# Patient Record
Sex: FEMALE | Race: WHITE | NOT HISPANIC OR LATINO | Employment: STUDENT | URBAN - METROPOLITAN AREA
[De-identification: names, ages, dates, MRNs, and addresses within clinical notes are randomized per-mention and may not be internally consistent; named-entity substitution may affect disease eponyms.]

---
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
## 2024-01-08 ENCOUNTER — OFFICE VISIT (OUTPATIENT)
Dept: URGENT CARE | Facility: CLINIC | Age: 24
End: 2024-01-08
Payer: COMMERCIAL

## 2024-01-08 VITALS
BODY MASS INDEX: 22.77 KG/M2 | TEMPERATURE: 99 F | OXYGEN SATURATION: 97 % | WEIGHT: 145.06 LBS | HEART RATE: 86 BPM | HEIGHT: 67 IN | DIASTOLIC BLOOD PRESSURE: 68 MMHG | RESPIRATION RATE: 18 BRPM | SYSTOLIC BLOOD PRESSURE: 113 MMHG

## 2024-01-08 DIAGNOSIS — J10.1 INFLUENZA A: Primary | ICD-10-CM

## 2024-01-08 DIAGNOSIS — R05.9 COUGH, UNSPECIFIED TYPE: ICD-10-CM

## 2024-01-08 DIAGNOSIS — R06.2 WHEEZING: ICD-10-CM

## 2024-01-08 LAB
CTP QC/QA: YES
POC MOLECULAR INFLUENZA A AGN: POSITIVE
POC MOLECULAR INFLUENZA B AGN: NEGATIVE

## 2024-01-08 PROCEDURE — 96372 THER/PROPH/DIAG INJ SC/IM: CPT | Mod: S$GLB,,,

## 2024-01-08 PROCEDURE — 99213 OFFICE O/P EST LOW 20 MIN: CPT | Mod: 25,S$GLB,,

## 2024-01-08 PROCEDURE — 87502 INFLUENZA DNA AMP PROBE: CPT | Mod: QW,S$GLB,,

## 2024-01-08 RX ORDER — DEXAMETHASONE SODIUM PHOSPHATE 100 MG/10ML
10 INJECTION INTRAMUSCULAR; INTRAVENOUS
Status: COMPLETED | OUTPATIENT
Start: 2024-01-08 | End: 2024-01-08

## 2024-01-08 RX ORDER — GUAIFENESIN 600 MG/1
1200 TABLET, EXTENDED RELEASE ORAL 2 TIMES DAILY
Qty: 40 TABLET | Refills: 0 | Status: SHIPPED | OUTPATIENT
Start: 2024-01-08 | End: 2024-01-18

## 2024-01-08 RX ORDER — ALBUTEROL SULFATE 90 UG/1
2 AEROSOL, METERED RESPIRATORY (INHALATION) EVERY 6 HOURS PRN
Qty: 18 G | Refills: 0 | Status: SHIPPED | OUTPATIENT
Start: 2024-01-08 | End: 2025-01-07

## 2024-01-08 RX ORDER — PROMETHAZINE HYDROCHLORIDE AND DEXTROMETHORPHAN HYDROBROMIDE 6.25; 15 MG/5ML; MG/5ML
5 SYRUP ORAL EVERY 4 HOURS PRN
Qty: 180 ML | Refills: 0 | Status: SHIPPED | OUTPATIENT
Start: 2024-01-08 | End: 2024-01-18

## 2024-01-08 RX ORDER — DEXTROAMPHETAMINE SACCHARATE, AMPHETAMINE ASPARTATE MONOHYDRATE, DEXTROAMPHETAMINE SULFATE AND AMPHETAMINE SULFATE 5; 5; 5; 5 MG/1; MG/1; MG/1; MG/1
20 CAPSULE, EXTENDED RELEASE ORAL EVERY MORNING
COMMUNITY

## 2024-01-08 RX ORDER — METHYLPREDNISOLONE 4 MG/1
TABLET ORAL
Qty: 21 EACH | Refills: 0 | Status: SHIPPED | OUTPATIENT
Start: 2024-01-08 | End: 2024-01-29

## 2024-01-08 RX ADMIN — DEXAMETHASONE SODIUM PHOSPHATE 10 MG: 100 INJECTION INTRAMUSCULAR; INTRAVENOUS at 01:01

## 2024-01-08 NOTE — PATIENT INSTRUCTIONS
Please drink plenty of fluids.  Please get plenty of rest.  Please return here or go to the Emergency Department for any concerns or worsening of condition.  If you were given a steroid shot in the clinic and have also been given a prescription for a steroid such as Prednisone or a Medrol Dose Pack, please begin taking them tomorrow.  Continue daily antihistamine like Claritin and daily nasal spray as directed.   Take mucinex during the day and promethazine-DM at night for cough. Use albuterol inhaler at least once today and use it tomorrow as needed.   Recommended for patient to drink hot tea with honey. Consider eating softer foods such as soup and broth for the next couple of days to prevent further throat irritation. Recommended for patient to refrain from acidic foods (such as tomatoes or caffeine) to prevent throat irritation for the next couple of days.   If you do not have Hypertension or any history of palpitations, it is ok to take over the counter Sudafed or Mucinex D or Allegra-D or Claritin-D or Zyrtec-D.  If you do take one of the above, it is ok to combine that with plain over the counter Mucinex or Allegra or Claritin or Zyrtec.  If for example you are taking Zyrtec -D, you can combine that with Mucinex, but not Mucinex-D.  If you are taking Mucinex-D, you can combine that with plain Allegra or Claritin or Zyrtec.   If you do have Hypertension or palpitations, it is safe to take Coricidin HBP for relief of sinus symptoms.  If not allergic, please take over the counter Tylenol (Acetaminophen) and/or Motrin (Ibuprofen) as directed for control of pain and/or fever.  Please follow up with your primary care doctor or specialist as needed.    If you  smoke, please stop smoking.

## 2024-01-08 NOTE — PROGRESS NOTES
"Subjective:      Patient ID: Cathi Aiken is a 23 y.o. female.    Vitals:  height is 5' 7" (1.702 m) and weight is 65.8 kg (145 lb 1 oz). Her oral temperature is 98.5 °F (36.9 °C). Her blood pressure is 113/68 and her pulse is 86. Her respiration is 18 and oxygen saturation is 97%.     Chief Complaint: Cough    24 y/o female presents to clinic with chief complaint of productive cough which is worse in the morning, body aches, vomiting, diarrhea, fever, SOB, chest congestion, rhinorrhea. Symptoms started 5 week ago. Patient has taken Nyquil, tylenol sinus and flu with no relief. She states one of her friends tested positive for flu from a recent vacation. Patient does have a history of seasonal allergies, she takes Claritin as needed. Denies numbness or tingling. Denies radiation of pain. Denies chills, chest pain, abdominal pain, nausea, urinary symptoms or rashes.         Cough  This is a new problem. The current episode started 1 to 4 weeks ago. The problem has been unchanged. The problem occurs constantly. The cough is Productive of sputum. Associated symptoms include chest pain, chills, a fever, headaches, nasal congestion, postnasal drip, shortness of breath and wheezing. Pertinent negatives include no ear congestion, ear pain, eye redness, hemoptysis, myalgias, rash or sore throat. The symptoms are aggravated by lying down. She has tried OTC cough suppressant for the symptoms. The treatment provided no relief. Her past medical history is significant for environmental allergies. There is no history of asthma, bronchiectasis, bronchitis, COPD, emphysema or pneumonia.       Constitution: Positive for chills, fever and generalized weakness. Negative for appetite change, sweating, fatigue and unexpected weight change.   HENT:  Positive for congestion and postnasal drip. Negative for ear pain, ear discharge, tinnitus, sinus pain, sinus pressure, sore throat, trouble swallowing and voice change.    Neck: Negative " for neck pain, neck stiffness and neck swelling.   Cardiovascular:  Positive for chest pain. Negative for leg swelling, palpitations and sob on exertion.   Eyes:  Negative for eye discharge, eye itching, eye pain and eye redness.   Respiratory:  Positive for chest tightness, cough, shortness of breath and wheezing. Negative for sleep apnea, sputum production, bloody sputum and COPD.    Gastrointestinal:  Positive for vomiting and diarrhea. Negative for abdominal pain, nausea and constipation.   Genitourinary:  Negative for dysuria, frequency and urgency.   Musculoskeletal:  Negative for pain and muscle ache.   Skin:  Negative for rash.   Allergic/Immunologic: Positive for environmental allergies and seasonal allergies. Negative for food allergies, itching and sneezing.   Neurological:  Positive for headaches. Negative for dizziness and light-headedness.   Psychiatric/Behavioral:  Negative for confusion.       Objective:     Physical Exam   Constitutional: She is oriented to person, place, and time. She appears well-developed. She is cooperative.  Non-toxic appearance. She appears ill. No distress.   HENT:   Head: Normocephalic and atraumatic.   Ears:   Right Ear: Hearing, tympanic membrane, external ear and ear canal normal.   Left Ear: Hearing, tympanic membrane, external ear and ear canal normal.   Nose: Mucosal edema and rhinorrhea present. No nasal deformity. No epistaxis. Right sinus exhibits maxillary sinus tenderness. Right sinus exhibits no frontal sinus tenderness. Left sinus exhibits maxillary sinus tenderness. Left sinus exhibits no frontal sinus tenderness.   Mouth/Throat: Uvula is midline and mucous membranes are normal. No trismus in the jaw. Normal dentition. No uvula swelling. Posterior oropharyngeal edema, posterior oropharyngeal erythema and cobblestoning present. No oropharyngeal exudate. Tonsils are 1+ on the right. Tonsils are 2+ on the left. No tonsillar exudate.   Eyes: Conjunctivae and lids  are normal. No scleral icterus. Extraocular movement intact   Neck: Trachea normal and phonation normal. Neck supple. No edema present. No erythema present. No neck rigidity present.   Cardiovascular: Normal rate, regular rhythm, normal heart sounds and normal pulses.   Pulmonary/Chest: Effort normal. No accessory muscle usage or stridor. No tachypnea and no bradypnea. No respiratory distress. She has no decreased breath sounds. She has wheezes. She has rhonchi. She has no rales.   Abdominal: Normal appearance.   Musculoskeletal: Normal range of motion.         General: No deformity. Normal range of motion.   Lymphadenopathy:     She has cervical adenopathy.   Neurological: She is alert and oriented to person, place, and time. She exhibits normal muscle tone. Coordination normal.   Skin: Skin is warm, dry, intact, not diaphoretic and not pale.   Psychiatric: Her speech is normal and behavior is normal. Judgment and thought content normal.   Nursing note and vitals reviewed.      Assessment:     1. Influenza A    2. Cough, unspecified type    3. Wheezing        Plan:     Results for orders placed or performed in visit on 01/08/24   POCT Influenza A/B MOLECULAR   Result Value Ref Range    POC Molecular Influenza A Ag Positive (A) Negative, Not Reported    POC Molecular Influenza B Ag Negative Negative, Not Reported     Acceptable Yes        Influenza A  -     dexAMETHasone injection 10 mg  -     methylPREDNISolone (MEDROL DOSEPACK) 4 mg tablet; use as directed  Dispense: 21 each; Refill: 0  -     guaiFENesin (MUCINEX) 600 mg 12 hr tablet; Take 2 tablets (1,200 mg total) by mouth 2 (two) times daily. for 10 days  Dispense: 40 tablet; Refill: 0  -     promethazine-dextromethorphan (PROMETHAZINE-DM) 6.25-15 mg/5 mL Syrp; Take 5 mLs by mouth every 4 (four) hours as needed (at night).  Dispense: 180 mL; Refill: 0  -     albuterol (VENTOLIN HFA) 90 mcg/actuation inhaler; Inhale 2 puffs into the lungs every 6  (six) hours as needed for Wheezing. Rescue  Dispense: 18 g; Refill: 0    Cough, unspecified type  -     POCT Influenza A/B MOLECULAR  -     guaiFENesin (MUCINEX) 600 mg 12 hr tablet; Take 2 tablets (1,200 mg total) by mouth 2 (two) times daily. for 10 days  Dispense: 40 tablet; Refill: 0  -     promethazine-dextromethorphan (PROMETHAZINE-DM) 6.25-15 mg/5 mL Syrp; Take 5 mLs by mouth every 4 (four) hours as needed (at night).  Dispense: 180 mL; Refill: 0    Wheezing  -     dexAMETHasone injection 10 mg  -     methylPREDNISolone (MEDROL DOSEPACK) 4 mg tablet; use as directed  Dispense: 21 each; Refill: 0  -     albuterol (VENTOLIN HFA) 90 mcg/actuation inhaler; Inhale 2 puffs into the lungs every 6 (six) hours as needed for Wheezing. Rescue  Dispense: 18 g; Refill: 0      Recommended for patient to drink hot tea with honey. Consider eating softer foods such as soup and broth for the next couple of days to prevent further throat irritation. Recommended for patient to refrain from acidic foods (such as tomatoes or caffeine) to prevent throat irritation for the next couple of days. Patient can take OTC Acetaminophen (Tylenol) and/or Ibuprofen (Motrin) as needed for pain relief and/or fever relief. Continue to drink plenty of fluids. Follow up with PCP.      Patient Instructions   Please drink plenty of fluids.  Please get plenty of rest.  Please return here or go to the Emergency Department for any concerns or worsening of condition.  If you were given a steroid shot in the clinic and have also been given a prescription for a steroid such as Prednisone or a Medrol Dose Pack, please begin taking them tomorrow.  Continue daily antihistamine like Claritin and daily nasal spray as directed.   Take mucinex during the day and promethazine-DM at night for cough. Use albuterol inhaler at least once today and use it tomorrow as needed.   Recommended for patient to drink hot tea with honey. Consider eating softer foods such as  soup and broth for the next couple of days to prevent further throat irritation. Recommended for patient to refrain from acidic foods (such as tomatoes or caffeine) to prevent throat irritation for the next couple of days.   If you do not have Hypertension or any history of palpitations, it is ok to take over the counter Sudafed or Mucinex D or Allegra-D or Claritin-D or Zyrtec-D.  If you do take one of the above, it is ok to combine that with plain over the counter Mucinex or Allegra or Claritin or Zyrtec.  If for example you are taking Zyrtec -D, you can combine that with Mucinex, but not Mucinex-D.  If you are taking Mucinex-D, you can combine that with plain Allegra or Claritin or Zyrtec.   If you do have Hypertension or palpitations, it is safe to take Coricidin HBP for relief of sinus symptoms.  If not allergic, please take over the counter Tylenol (Acetaminophen) and/or Motrin (Ibuprofen) as directed for control of pain and/or fever.  Please follow up with your primary care doctor or specialist as needed.    If you  smoke, please stop smoking.           Additional MDM:     Heart Failure Score:   COPD = No

## 2024-05-28 ENCOUNTER — OFFICE VISIT (OUTPATIENT)
Dept: RHEUMATOLOGY | Facility: CLINIC | Age: 24
End: 2024-05-28
Payer: COMMERCIAL

## 2024-05-28 ENCOUNTER — LAB VISIT (OUTPATIENT)
Dept: LAB | Facility: HOSPITAL | Age: 24
End: 2024-05-28
Attending: INTERNAL MEDICINE
Payer: COMMERCIAL

## 2024-05-28 VITALS
HEART RATE: 81 BPM | BODY MASS INDEX: 20.48 KG/M2 | DIASTOLIC BLOOD PRESSURE: 85 MMHG | WEIGHT: 130.75 LBS | SYSTOLIC BLOOD PRESSURE: 126 MMHG

## 2024-05-28 DIAGNOSIS — M25.50 HYPERMOBILITY ARTHRALGIA: ICD-10-CM

## 2024-05-28 DIAGNOSIS — F51.01 PRIMARY INSOMNIA: ICD-10-CM

## 2024-05-28 DIAGNOSIS — M25.50 POLYARTHRALGIA: Primary | ICD-10-CM

## 2024-05-28 DIAGNOSIS — M25.50 POLYARTHRALGIA: ICD-10-CM

## 2024-05-28 LAB
CCP AB SER IA-ACNC: <0.5 U/ML
CRP SERPL-MCNC: 0.3 MG/L (ref 0–8.2)
ERYTHROCYTE [SEDIMENTATION RATE] IN BLOOD BY PHOTOMETRIC METHOD: 4 MM/HR (ref 0–36)
RHEUMATOID FACT SERPL-ACNC: <13 IU/ML (ref 0–15)

## 2024-05-28 PROCEDURE — 3008F BODY MASS INDEX DOCD: CPT | Mod: CPTII,S$GLB,, | Performed by: INTERNAL MEDICINE

## 2024-05-28 PROCEDURE — 3079F DIAST BP 80-89 MM HG: CPT | Mod: CPTII,S$GLB,, | Performed by: INTERNAL MEDICINE

## 2024-05-28 PROCEDURE — 1159F MED LIST DOCD IN RCRD: CPT | Mod: CPTII,S$GLB,, | Performed by: INTERNAL MEDICINE

## 2024-05-28 PROCEDURE — 99205 OFFICE O/P NEW HI 60 MIN: CPT | Mod: S$GLB,,, | Performed by: INTERNAL MEDICINE

## 2024-05-28 PROCEDURE — 81374 HLA I TYPING 1 ANTIGEN LR: CPT | Performed by: INTERNAL MEDICINE

## 2024-05-28 PROCEDURE — 36415 COLL VENOUS BLD VENIPUNCTURE: CPT | Performed by: INTERNAL MEDICINE

## 2024-05-28 PROCEDURE — 86431 RHEUMATOID FACTOR QUANT: CPT | Performed by: INTERNAL MEDICINE

## 2024-05-28 PROCEDURE — 85652 RBC SED RATE AUTOMATED: CPT | Performed by: INTERNAL MEDICINE

## 2024-05-28 PROCEDURE — 86140 C-REACTIVE PROTEIN: CPT | Performed by: INTERNAL MEDICINE

## 2024-05-28 PROCEDURE — 86200 CCP ANTIBODY: CPT | Performed by: INTERNAL MEDICINE

## 2024-05-28 PROCEDURE — 99999 PR PBB SHADOW E&M-EST. PATIENT-LVL IV: CPT | Mod: PBBFAC,,, | Performed by: INTERNAL MEDICINE

## 2024-05-28 PROCEDURE — 1160F RVW MEDS BY RX/DR IN RCRD: CPT | Mod: CPTII,S$GLB,, | Performed by: INTERNAL MEDICINE

## 2024-05-28 PROCEDURE — 3074F SYST BP LT 130 MM HG: CPT | Mod: CPTII,S$GLB,, | Performed by: INTERNAL MEDICINE

## 2024-05-28 RX ORDER — GABAPENTIN 100 MG/1
CAPSULE ORAL
Qty: 90 CAPSULE | Refills: 3 | Status: SHIPPED | OUTPATIENT
Start: 2024-05-28

## 2024-05-28 NOTE — PROGRESS NOTES
Subjective:      Patient ID: Cathi Aiken is a 24 y.o. female.    Chief Complaint: No chief complaint on file.    HPI  Review of Systems     Objective:   There were no vitals taken for this visit.  Physical Exam    No data to display     Assessment:     No diagnosis found.      Plan:     Problem List Items Addressed This Visit    None    ***

## 2024-05-28 NOTE — PROGRESS NOTES
No chief complaint on file.      Patient was referred by     History of presenting illness    Location  Quality  Severity  Timing  Location  Duration  Context  Modifying factors  Associated signs and symptoms        Joint involvement as reported by the patient    Patient reported side effects of medications    Past history    Family history    Social history        Review of Systems   Constitutional:  Negative for fever and unexpected weight change.   HENT:  Negative for mouth sores and trouble swallowing.    Eyes:  Positive for redness.   Respiratory:  Positive for shortness of breath. Negative for cough.    Cardiovascular:  Negative for chest pain.   Gastrointestinal:  Positive for constipation. Negative for diarrhea.   Genitourinary:  Positive for dysuria. Negative for genital sores.   Skin:  Negative for rash.   Neurological:  Positive for headaches.   Hematological:  Bruises/bleeds easily.         No skin rashes,malar rash,photosensitivity   No telangiectasias   No calcinosis   No psoriasis   No patchy alopecia   No oral and nasal ulcers   No dry eyes and dry mouth   No pleurisy or any cardiopulmonary complaints   No dysphagia,diplopia and dysphonia and muscle weakness   No n/v/d/c   No acid reflux+   No raynaud's+   No digital ulcers   No cytopenias   No renal issues   No blood clots   No fever,chills,night sweats,weight loss and loss of appetite   No pregnancy losses/pre term deliveries /pregnancy complications   No new onset headaches   No recurrent conjunctivitis or uveitis or scleritis or episcleritis   No chronic or bloody diarrhea with no u colitis or crohn's /inflammatory bowel disease   No vaginal or penile and urethral  d/c/STDs/no ulcers   No unexplained lymphadenopathy,parotitis   No seizures,strokes,psychosis  No sclerodactyly  No puffy hands  No perioral tightness       There is currently no information documented on the homunculus. Go to the Rheumatology activity and complete the homunculus joint  exam.    Physical Exam   Constitutional: She is oriented to person, place, and time. No distress.   HENT:   Head: Normocephalic.   Mouth/Throat: Oropharynx is clear and moist.   Eyes: Pupils are equal, round, and reactive to light. Conjunctivae are normal. Right eye exhibits no discharge. Left eye exhibits no discharge. No scleral icterus.   Neck: No thyromegaly present.   Cardiovascular: Normal rate, regular rhythm and normal heart sounds.   Pulmonary/Chest: Effort normal and breath sounds normal. No stridor.   Abdominal: Soft. Bowel sounds are normal.   Musculoskeletal:         General: Normal range of motion.      Cervical back: Normal range of motion.   Lymphadenopathy:     She has no cervical adenopathy.   Neurological: She is alert and oriented to person, place, and time.   Skin: Skin is warm. No rash noted. She is not diaphoretic.   Psychiatric: Affect and judgment normal.     Widespread pain index  Note the areas which the patient has had pain over the last week:                   Shoulder-girdle, left               Shoulder-girdle, right                         Upper arm left                       Upper arm right                         Lower arm left                       Lower arm right    Hip (buttock, trochanter) left  Hip (buttock, trochanter) right                           Upper leg, left                         Upper leg, right                           Lower leg, left                         Lower leg, right                                     Jaw, left                                   Jaw, right                                        Chest                                  Abdomen                               Upper back                              Lower back                                        Neck  Score will be from 0-19:                                         Symptom severity score  Fatigue   Waking Unrefreshed  Cognitive Symptoms   0 = no problem, 1=slight or mild problem 2= moderate;  considerable problems often present and/or at a moderate level, 3 = severe, pervasive, continuous, life disturbing problem  For each of the 3 symptoms, indicate the level of severity over the past week using the Scale.  The symptom severity score is the sum of the severity of the 3 symptoms (fatigue, waking unrefreshed, and cognitive symptoms) plus the number of the following symptoms occurring during the previous 6 months:   Headaches  Pain or cramps in the lower abdomen  Depression  The final score is between 0 and 12                                          Criteria  Patient has fibromyalgia if the following 3 conditions are met:  1.  Widespread pain index greater than or equal to 7 and symptom severity score greater than or equal to 5 or widespread pain index between 3- 6, and symptom severity score greater than or equal to 9.    2.  Symptoms have been present in a similar level for at least 3 months  3.  The patient does not have a disorder that would otherwise sufficiently explain the pain      Laboratory abnormalities  Changes made based on the labs     Imaging reviewed    Vaccination     Recent hospitalisations and new events since the last time patient was seen in the clinic    Assessment   No diagnosis found.    Reviewed labs/xrays  Reviewed medications    Ordered labs /xrays    I have independently reviewed     Stable  Established    Getting better  Getting worse    New problem     Plan  There are no diagnoses linked to this encounter.    Medication changes made  Refilled medications  Toxicity issues discussed    Old records reviewed and summarised  Records are from       Follow up in     Notes will be sent to PCP    Preventive medicine

## 2024-05-28 NOTE — PROGRESS NOTES
Chief Complaint   Patient presents with    Disease Management       Patient was referred by PCP    History of presenting illness    24 year old female comes in with      Joint pain for the past year     Constant, but comes and goes in different severities   Worsens with activity  Wakes up stiff in the morning for approximately 10 minutes   Worse pain at night  Walking doesn't hurt  But resting after activity makes the pain worse    Hands - numbness and burning in the hands  Joints hurt  Joints swell  Joints stiffen  Morning stiffness  Activity makes joint pain worse in the hands    Neck and shoulder girdle hurts the worse    SI joints hurts the worse  Back stiffness for few minutes  Pain doesn't wake her up in the mornings  Back pain doesn't get worse with prolonged inactivity  MRI LS spine with bulging discs  PT for 6 months did not help  Flexeril offered briefly- it didn't help much    Ankle pain  No recurrent sprains  Pain worse at rest  They never relax  Walking doesn't make the pain worse    Toes dont hurt    Knees hurt on the outside     Jaw pain is worse  She has TMJ  She does dry needling, which helps  She gets a mouth guard from the orthodontist    Went to the ER in February because of the joint pain, SOB and chest pain at [facility].   Got referred to rheumatology in [facility] but decided to come here   She was told to have panic attacks    2. Unexplainable bruising on hands and legs     3. Muscle spasms and muscle tension     4) h/o recurrent shoulder dislocations  Left ankle sprain once  ? Pelvic floor prolapse    Past history: None    Family history: Dad has hypo thyroidism, HTN, CAD. Paternal grandmother has arthritis and maternal grandmother had OA and fibromyalgia     Social history: Alcohol socially, vaping         Review of Systems   Constitutional:  Negative for fever and unexpected weight change.   HENT:  Negative for mouth sores and trouble swallowing.    Eyes:  Positive for redness.    Respiratory:  Positive for shortness of breath. Negative for cough.    Cardiovascular:  Negative for chest pain.   Gastrointestinal:  Positive for constipation. Negative for diarrhea.   Genitourinary:  Positive for dysuria. Negative for genital sores.   Skin:  Negative for rash.   Neurological:  Positive for headaches.   Hematological:  Bruises/bleeds easily.         No skin rashes,malar rash,photosensitivity   No telangiectasias   No calcinosis   No psoriasis   No patchy alopecia   Frequent oral ulcers- mother has them too   Dry eyes and dry mouth   No pleurisy or any cardiopulmonary complaints   Dysphagia,muscle weakness   No diplopia and dysphonia   No v/d/  Nausea and constipation- she doesn't have IBS - when she was young  No acid reflux+   No raynaud's+   No digital ulcers   No cytopenias   No renal issues   No blood clots   No fever,chills,night sweats,weight loss and loss of appetite   No pregnancy losses/pre term deliveries /pregnancy complications   Headaches  No recurrent conjunctivitis or uveitis or scleritis or episcleritis   No chronic or bloody diarrhea with no u colitis or crohn's /inflammatory bowel disease   No vaginal or penile and urethral  d/c/STDs/no ulcers   No unexplained lymphadenopathy,parotitis   No seizures,strokes,psychosis  No sclerodactyly  No puffy hands  No perioral tightness           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 No distress.   HENT:   Head: Normocephalic.   Mouth/Throat: Oropharynx is clear and moist.   Eyes: Pupils are equal, round, and reactive to light. Conjunctivae are normal. Right eye exhibits no discharge. Left eye exhibits no discharge. No scleral icterus.   Neck: No thyromegaly present.   Cardiovascular: Normal rate, regular rhythm and normal heart sounds.   Pulmonary/Chest: Effort normal and breath  sounds normal. No stridor.   Abdominal: Soft. Bowel sounds are normal.   Musculoskeletal:         General: Normal range of motion.      Cervical back: Normal range of motion.   Lymphadenopathy:     She has no cervical adenopathy.   Neurological: She is alert and oriented to person, place, and time.   Skin: Skin is warm. No rash noted. She is not diaphoretic.   Psychiatric: Affect and judgment normal.     Widespread pain index  Note the areas which the patient has had pain over the last week:                   Shoulder-girdle, left 11               Shoulder-girdle, right 10                         Upper arm left 9                       Upper arm right 8                         Lower arm left                       Lower arm right     Hip (buttock, trochanter) left  Hip (buttock, trochanter) right                            Upper leg, left                          Upper leg, right                            Lower leg, left 7                         Lower leg, right 6                                     Jaw, left 5                                   Jaw, right 4                                        Chest                                   Abdomen                               Upper back 3                              Lower back 2                                        Neck 1  Score will be from 0-19: 11/19                                         Symptom severity score  Fatigue 2  Waking Unrefreshed 1  Cognitive Symptoms 0   0 = no problem, 1=slight or mild problem 2= moderate; considerable problems often present and/or at a moderate level, 3 = severe, pervasive, continuous, life disturbing problem  For each of the 3 symptoms, indicate the level of severity over the past week using the Scale.  The symptom severity score is the sum of the severity of the 3 symptoms (fatigue, waking unrefreshed, and cognitive symptoms) plus the number of the following symptoms occurring during the previous 6 months:   Headaches 1  Pain or  cramps in the lower abdomen 1  Depression 0  The final score is between 0 and 12 5/12                                          Criteria  Patient has fibromyalgia if the following 3 conditions are met:  1.  Widespread pain index greater than or equal to 7 and symptom severity score greater than or equal to 5 or widespread pain index between 3- 6, and symptom severity score greater than or equal to 9.    2.  Symptoms have been present in a similar level for at least 3 months  3.  The patient does not have a disorder that would otherwise sufficiently explain the pain      Laboratory abnormalities    CBC nml  Mag nml  T4,TSH nml  CK nml  CMP nml  Urine THC  UA nml      Assessment     24 year old female comes in with    Polyarticular Joint pains for the past year     Constant, but comes and goes in different severities   Worsens with activity  Wakes up stiff in the morning for approximately 10 minutes   Worse pain at night  Walking doesn't hurt  But resting after activity makes the pain worse    Hands - numbness and burning in the hands  Joints hurt  Joints swell  Joints stiffen  Morning stiffness  Activity makes joint pain worse in the hands    Neck and shoulder girdle hurts the worse    SI joints hurts the worse  Back stiffness for few minutes  Pain doesn't wake her up in the mornings  Back pain doesn't get worse with prolonged inactivity  MRI LS spine with bulging discs  PT for 6 months did not help  Flexeril offered briefly- it didn't help much    Ankle pain  No recurrent sprains  Pain worse at rest  They never relax  Walking doesn't make the pain worse    Toes dont hurt    Knees hurt on the outside     Jaw pain is worse  She has TMJ  She does dry needling, which helps  She gets a mouth guard from the orthodontist    Went to the ER in February because of the joint pain, SOB and chest pain at [facility].   Got referred to rheumatology in [facility] but decided to come here   She was told to have panic  attacks    2. Unexplainable bruising on hands and legs     3. Muscle spasms and muscle tension     4) h/o recurrent shoulder dislocations  Left ankle sprain once  ? Pelvic floor prolapse        Beighton score for joint hypermobility*  Ability to: Left   Right   Passively dorsiflex the 5th metacarpophalangeal joint by at least 90 degrees 1   1   Oppose the thumb to the volar aspect of the ipsilateral forearm 1   1   Hyperextend the elbow by at least 10 degrees 1   1   Hyperextend the knee by at least 10 degrees 1   1   Place the hands flat on the floor without bending the knees   1     * 1 point is scored for each of the maneuvers above. A total of 9 points is achievable, and 4 or more points is considered an indication of generalized joint hypermobility.    Her score is  : 9/9  4+ is sufficient   She has hypermobility of the joints+ easy skin bruisability      We reviewed this with her    The 2017 international criteria for hypermobile Caitlin-Danlos syndrome    Criteria Score   Criterion 1: Generalized joint hypermobility (GJH):   Beighton score: ?6 for pre-pubertal children and adolescents, ?5 for pubertal and post-pubertal men and women up to the age of 50, and ?4 for men and women >50 years of age for hypermobile Caitlin-Danlos syndrome (hEDS). If the score is 4 or less and the person is positive for 2 or more of the 5-part questionnaire, add 1 point to the Beighton score.   Criterion 2: 2 or more of A, B, and C   Feature A: Manifestations of a connective tissue disorder:   Fascia:  Unusually soft or velvety skin  Mild skin hyperextensibility  Unexplained striae (ie, excluding striae distensae or rubrae)  Bilateral piezogenic papules of the heel  Recurrent or multiple abdominal hernia(s) (2 points)  Atrophic scarring involving at least two sites and without the formation of truly papyraceous and/or hemosiderotic scars as seen in classical EDS  Pelvic floor and/or rectal prolapse in children, men, or nulliparous  women without predisposing medical condition; uterine prolapse in children or nulliparous women without predisposing medical condition  Marfanoid features:  Dental crowding and high-arched or narrow palate  Mitral valve prolapse (MVP) mild or greater based on strict echocardiographic criteria  Arm [id number] ?1.05  Arachnodactyly, as defined in one or more of the following: (i) positive wrist sign (Larissa sign) on both sides; (ii) positive thumb sign (Walker sign) on both sides  Aortic root dilatation with Z-score >+2 Must have ?5 of these 12 findings   Feature B: Family history   One or more first-degree relatives independently meeting the current diagnostic criteria for hEDS     Feature C: Musculoskeletal complications   Musculoskeletal pain in 2 or more limbs, recurring daily for at least 3 months  Widespread pain for ?3 months  Recurrent joint dislocations or anupama joint instability, in the absence of trauma  3 or more atraumatic dislocations in the same joint or 2 or more atraumatic dislocations in 2 different joints occurring at different times  Medical confirmation of joint instability at 2 or more sites not related to trauma Must have at least 1   Criterion 3: All the following prerequisites MUST be met   Absence of unusual skin fragility, which should prompt consideration of other types of EDS.  Exclusion of other heritable and acquired connective tissue disorders, including autoimmune rheumatologic conditions as a primary cause for signs and symptoms. In patients with an acquired connective tissue disorder (eg, systemic lupus erythematosus, rheumatoid arthritis, etc), additional diagnosis of hEDS requires meeting both features A and B of criterion 2. Feature C of criterion 2 (chronic pain and/or instability) cannot be counted towards a diagnosis of hEDS in this situation.  Exclusion of diagnoses that may also include joint hypermobility by means of hypotonia and/or connective tissue laxity.  Alternative diagnoses and diagnostic categories include, but are not limited to, neuromuscular disorders and other hereditary disorders of connective tissue (eg, other types of EDS, Loeys-Mamadou syndrome, Marfan syndrome), and skeletal dysplasias.   For a diagnosis of hEDS, a person must meet criterion 1; at least 2 of features A, B, and C in criterion 2; and have no other primary explanation for the clinical findings as per criterion 3.        Hypermobility          Work up needed    Fibromyalgia  Would like to rule out an inflammatory arthritis    ADD- on adderrall    1. Polyarthralgia    2. Hypermobility arthralgia    3. Primary insomnia        Reviewed labs/xrays  Reviewed medications      New problem     Plan    RF,CCP,ESR,CRP  HLAB27    Xrays  Hands,ankles,wrists,SI joints    Genetics- for evaluation of hypermobility     Physical therapy - who knows how to handle hypermobility    Address sleep issues      Will first address the Pelvic floor issues    She takes gabapentin at night -from her dad- 300 mg     Eventually    Migraines/headache clinic    Gastro    More than 60 minutes spent taking care of the patient    Rtc 3 months    Cathi was seen today for disease management.    Diagnoses and all orders for this visit:    Polyarthralgia  -     Rheumatoid Factor; Future  -     Cyclic Citrullinated Peptide Antibody, IgG; Future  -     Sedimentation rate; Future  -     C-Reactive Protein; Future  -     HLA B27 Antigen; Future  -     X-Ray Hand 3 View Bilateral; Future  -     X-Ray Ankle Complete Bilateral; Future  -     X-Ray Wrist Complete Bilateral; Future  -     X-Ray Sacroiliac Joints 3 Views; Future    Hypermobility arthralgia  -     Ambulatory referral/consult to Genetics; Future    Primary insomnia  -     Ambulatory referral/consult to Sleep Disorders; Future    Other orders  -     gabapentin (NEURONTIN) 100 MG capsule; 100 mg bedtime to 300 mg bedtime -titrate depending on the amount of  pain        Medication changes made  Refilled medications  Toxicity issues discussed    Old records reviewed and summarised  Records are from       Follow up in     Notes will be sent to PCP    Preventive medicine

## 2024-05-29 ENCOUNTER — TELEPHONE (OUTPATIENT)
Dept: GENETICS | Facility: CLINIC | Age: 24
End: 2024-05-29
Payer: COMMERCIAL

## 2024-05-29 NOTE — TELEPHONE ENCOUNTER
LVM informing pt that she will be added to a scheduling list due to needing to see a in clinic . We currently do not have any but are expected to get two in the fall. Appt could be as far out as April 2025. Call office callback [phone] for more information

## 2024-05-31 ENCOUNTER — HOSPITAL ENCOUNTER (OUTPATIENT)
Dept: RADIOLOGY | Facility: HOSPITAL | Age: 24
Discharge: HOME | End: 2024-05-31
Attending: INTERNAL MEDICINE
Payer: COMMERCIAL

## 2024-05-31 DIAGNOSIS — M25.50 POLYARTHRALGIA: ICD-10-CM

## 2024-05-31 PROCEDURE — 73110 X-RAY EXAM OF WRIST: CPT | Mod: TC,50

## 2024-05-31 PROCEDURE — 73110 X-RAY EXAM OF WRIST: CPT | Mod: 26,,, | Performed by: RADIOLOGY

## 2024-05-31 PROCEDURE — 72200 X-RAY EXAM SI JOINTS: CPT | Mod: TC

## 2024-05-31 PROCEDURE — 73130 X-RAY EXAM OF HAND: CPT | Mod: TC,50

## 2024-05-31 PROCEDURE — 73610 X-RAY EXAM OF ANKLE: CPT | Mod: 26,,, | Performed by: RADIOLOGY

## 2024-05-31 PROCEDURE — 73130 X-RAY EXAM OF HAND: CPT | Mod: 26,,, | Performed by: RADIOLOGY

## 2024-05-31 PROCEDURE — 73610 X-RAY EXAM OF ANKLE: CPT | Mod: TC,50

## 2024-05-31 PROCEDURE — 72200 X-RAY EXAM SI JOINTS: CPT | Mod: 26,,, | Performed by: RADIOLOGY

## 2024-06-05 ENCOUNTER — PATIENT MESSAGE (OUTPATIENT)
Dept: RHEUMATOLOGY | Facility: CLINIC | Age: 24
End: 2024-06-05
Payer: COMMERCIAL

## 2024-06-18 LAB
HLA B27 INTERPRETATION: NORMAL
HLA-B27 RELATED AG QL: NEGATIVE
HLA-B27 RELATED AG QL: NORMAL

## 2024-06-19 ENCOUNTER — PATIENT MESSAGE (OUTPATIENT)
Dept: RHEUMATOLOGY | Facility: CLINIC | Age: 24
End: 2024-06-19
Payer: COMMERCIAL

## 2024-06-21 ENCOUNTER — PATIENT MESSAGE (OUTPATIENT)
Dept: RHEUMATOLOGY | Facility: CLINIC | Age: 24
End: 2024-06-21
Payer: COMMERCIAL

## 2024-07-09 ENCOUNTER — PATIENT MESSAGE (OUTPATIENT)
Dept: RHEUMATOLOGY | Facility: CLINIC | Age: 24
End: 2024-07-09
Payer: COMMERCIAL

## 2024-07-09 DIAGNOSIS — R10.9 ABDOMINAL PAIN, UNSPECIFIED ABDOMINAL LOCATION: Primary | ICD-10-CM

## 2024-07-19 ENCOUNTER — HOSPITAL ENCOUNTER (OUTPATIENT)
Dept: RADIOLOGY | Facility: HOSPITAL | Age: 24
Discharge: HOME | End: 2024-07-19
Attending: INTERNAL MEDICINE
Payer: COMMERCIAL

## 2024-07-19 DIAGNOSIS — M54.9 DORSALGIA, UNSPECIFIED: ICD-10-CM

## 2024-07-19 PROCEDURE — 72141 MRI NECK SPINE W/O DYE: CPT | Mod: TC

## 2024-07-19 PROCEDURE — 72148 MRI LUMBAR SPINE W/O DYE: CPT | Mod: 26,,, | Performed by: INTERNAL MEDICINE

## 2024-07-19 PROCEDURE — 72141 MRI NECK SPINE W/O DYE: CPT | Mod: 26,,, | Performed by: INTERNAL MEDICINE

## 2024-07-19 PROCEDURE — 72146 MRI CHEST SPINE W/O DYE: CPT | Mod: TC

## 2024-07-19 PROCEDURE — 72148 MRI LUMBAR SPINE W/O DYE: CPT | Mod: TC

## 2024-07-19 PROCEDURE — 72146 MRI CHEST SPINE W/O DYE: CPT | Mod: 26,,, | Performed by: INTERNAL MEDICINE

## 2024-08-09 ENCOUNTER — PATIENT MESSAGE (OUTPATIENT)
Dept: ADMINISTRATIVE | Facility: OTHER | Age: 24
End: 2024-08-09
Payer: COMMERCIAL

## 2024-08-16 ENCOUNTER — OFFICE VISIT (OUTPATIENT)
Dept: NEUROLOGY | Facility: CLINIC | Age: 24
End: 2024-08-16
Payer: COMMERCIAL

## 2024-08-16 VITALS
HEIGHT: 67 IN | DIASTOLIC BLOOD PRESSURE: 77 MMHG | WEIGHT: 130.31 LBS | SYSTOLIC BLOOD PRESSURE: 109 MMHG | HEART RATE: 92 BPM | BODY MASS INDEX: 20.45 KG/M2

## 2024-08-16 DIAGNOSIS — M54.9 UPPER BACK PAIN: ICD-10-CM

## 2024-08-16 DIAGNOSIS — G43.019 INTRACTABLE MIGRAINE WITHOUT AURA AND WITHOUT STATUS MIGRAINOSUS: Primary | ICD-10-CM

## 2024-08-16 DIAGNOSIS — G44.86 CERVICOGENIC HEADACHE: ICD-10-CM

## 2024-08-16 PROCEDURE — 3078F DIAST BP <80 MM HG: CPT | Mod: CPTII,S$GLB,, | Performed by: STUDENT IN AN ORGANIZED HEALTH CARE EDUCATION/TRAINING PROGRAM

## 2024-08-16 PROCEDURE — 1160F RVW MEDS BY RX/DR IN RCRD: CPT | Mod: CPTII,S$GLB,, | Performed by: STUDENT IN AN ORGANIZED HEALTH CARE EDUCATION/TRAINING PROGRAM

## 2024-08-16 PROCEDURE — 99999 PR PBB SHADOW E&M-EST. PATIENT-LVL III: CPT | Mod: PBBFAC,,, | Performed by: STUDENT IN AN ORGANIZED HEALTH CARE EDUCATION/TRAINING PROGRAM

## 2024-08-16 PROCEDURE — 1159F MED LIST DOCD IN RCRD: CPT | Mod: CPTII,S$GLB,, | Performed by: STUDENT IN AN ORGANIZED HEALTH CARE EDUCATION/TRAINING PROGRAM

## 2024-08-16 PROCEDURE — 99204 OFFICE O/P NEW MOD 45 MIN: CPT | Mod: S$GLB,,, | Performed by: STUDENT IN AN ORGANIZED HEALTH CARE EDUCATION/TRAINING PROGRAM

## 2024-08-16 PROCEDURE — 3008F BODY MASS INDEX DOCD: CPT | Mod: CPTII,S$GLB,, | Performed by: STUDENT IN AN ORGANIZED HEALTH CARE EDUCATION/TRAINING PROGRAM

## 2024-08-16 PROCEDURE — 3074F SYST BP LT 130 MM HG: CPT | Mod: CPTII,S$GLB,, | Performed by: STUDENT IN AN ORGANIZED HEALTH CARE EDUCATION/TRAINING PROGRAM

## 2024-08-16 RX ORDER — ONDANSETRON 4 MG/1
4 TABLET, ORALLY DISINTEGRATING ORAL EVERY 8 HOURS PRN
Qty: 20 TABLET | Refills: 5 | Status: SHIPPED | OUTPATIENT
Start: 2024-08-16

## 2024-08-16 RX ORDER — CYCLOBENZAPRINE HCL 5 MG
5 TABLET ORAL NIGHTLY
Qty: 30 TABLET | Refills: 1 | Status: SHIPPED | OUTPATIENT
Start: 2024-08-16

## 2024-08-16 RX ORDER — LISDEXAMFETAMINE DIMESYLATE 40 MG/1
40 CAPSULE ORAL DAILY
COMMUNITY
Start: 2024-07-18

## 2024-08-16 RX ORDER — CYCLOBENZAPRINE HCL 5 MG
5 TABLET ORAL 3 TIMES DAILY
COMMUNITY
Start: 2024-04-22 | End: 2024-08-16 | Stop reason: SDUPTHER

## 2024-08-16 NOTE — PROGRESS NOTES
Patient ID: [id number]  Referring Physician: No ref. provider found    Chief Complaint/Reason for Consult: Headaches and back pain  Subjective:     HPI:  Cathi Aiken is a 24 y.o. RH female with no significant PMHx except chronic back pain. she is presenting today as a new patient for evaluation of multiple symptoms including headaches and back pain.     She has been having back pain x1 year.  Pain was in lower back now progressed to mid and upper back and headaches x5 months  Completed PT for low back last year which did not help.  She has seen rheum, diagnose with EDS, awaiting genetic testing    Headache history  Age at onset: 5 months ago  Course over time: increasing in frequency  Location: unilateral/bilateral temples and wraps around to the occipital area and neck   Character:  throbbing, tight/tension in the head, sharp pains in shoulders  Intensity: 7 on a scale from 1 to 10  Frequency: every day.   Duration: several hours  Timing: early mornings and late evenings  Mild/moderate/severe. Work attendance or other daily activities are affected by the headaches.  Aura: not preceded by an aura  Associated symptoms: N/V, Photosensitivity, and Phonophobia   Associated neurologic symptoms: dizziness with headaches,  tingling in fingers and transient blurry vision regardless of headches  Precipitating factors: poor posture  Aggravating factors:   Home treatment: Meloxicam and Tylenol with marked improvement.   ER visits: No  Positive Hx of: No Head trauma  Is medication overuse contributing to the patient's current migraine burden: No      Review of Systems:  Review of Systems   Eyes:  Positive for blurred vision and photophobia. Negative for double vision.   Gastrointestinal:  Positive for nausea. Negative for vomiting.   Musculoskeletal:  Positive for back pain, joint pain and neck pain. Negative for falls.   Neurological:  Positive for dizziness, tingling and headaches. Negative for sensory change and  weakness.   All other systems reviewed and are negative.     Past Medical History:  No past medical history on file.    Allergies:  Review of patient's allergies indicates:  No Known Allergies    Pertinent Family History:  Family History   Problem Relation Name Age of Onset    Thyroid disease Mother      Cancer Mother      Osteoarthritis Mother      Thyroid disease Father      Heart disease Father      Chronic back pain Father         Pertinent Social History:  Social History     Tobacco Use    Smoking status: Never    Smokeless tobacco: Never   Substance Use Topics    Alcohol use: Yes       Medications:  Current Outpatient Medications   Medication Instructions    albuterol (VENTOLIN HFA) 90 mcg/actuation inhaler 2 puffs, Inhalation, Every 6 hours PRN, Rescue    dextroamphetamine-amphetamine (ADDERALL XR) 20 MG 24 hr capsule 20 mg, Oral, Every morning    gabapentin (NEURONTIN) 100 MG capsule 100 mg bedtime to 300 mg bedtime -titrate depending on the amount of pain        Objective:     Vitals:    08/16/24 1052   BP: 109/77   Pulse: 92        General:  Well-appearing, well-nourished, NAD, cooperative  MSK: TTP on occipital, cervical paraspinal muscles and traps    Neurologic Exam:   Awake, alert and oriented x3  Speech spontaneous and fluent, intact comprehension.   Adequate fund of knowledge, vocabulary.    CN II - CN XII:  PERRLA. EOM intact. No Nystagmus. No ophthalmoplegia.   Facial sensation is diminished on left (all 3 distributions).   Facial expression is full and symmetric.   Hearing is intact bilaterally.   Palate elevates symmetrically.   SCM and Trapezius full strength bilaterally.   Tongue is midline.     Motor:  Normal bulk and tone in all four limbs.   There are no atrophy or fasciculations. No tremor.     Shoulder  Abd Shoulder Add Elbow   Flex Elbow  Ext Wrist   Flex Wrist  Ext Finger  Flex Finger  Ext Finger  Abd Finger   Add IO Opposition   Right 5 5 5 5       5    Left 5 5 5 5       5        Hip  Flex Hip  Ext Thigh   Abd Thigh  Add Knee  Flex Knee  Ext Plantar  Flex Dorsiflex   Right 5 5   5 5 5 5   Left 5 5   5 5 5 5     Sensory:  Light touch: reduced tactile sense on LLE  Proprioception: proprioceptive sense present on RUE and on LUE  Romberg is Exam: negative    DTRs:   Biceps Brachioradialis Triceps Flory Patellar Ankle Plantar   Right 2+ 2+  - 2+ 2+    Left 2+ 2+  - 2+ 2+      Coordination:  Finger to nose is normal bilaterally.  Normal fine finger movements and rapid alternating movements.    Gait:  Normal casual and tandem gait.    Pertinent lab results  Lab Results   Component Value Date    RF <13.0 05/28/2024    SEDRATE 4 05/28/2024   CCP Ab negative  CRP negative  HLA B27 negative    Lab Results   Component Value Date    TSH 0.55 02/13/2024     Pertinent imaging results    *Images personally reviewed and interpreted:  7/19/2024  MRI Cervical + Thoracic + Lumbar Spine wo contrast:  Multilevel degenerative changes, most prominent at L4-5 and L5-S1   No significant canal or foraminal narrowing     Other pertinent studies  None    Assessment:   Cathi Aiken is a 24 y.o. RH female with no significant PMHx except chronic back pain who presents for evaluation of upper back/neck pain and headaches. Characteristics are most consistent with  overlapping migraines without aura and cervicogenic headaches, this could be seen in the setting of underlying connective tissue/rheumatologic disorder such as EDS or spontaneously. I'd recommend a course of neck PT, and daily muscle relaxant. I have reviewed MRIs of spinal cord personally. We will obtain imaging of the brain to exclude intracranial pathologies, thorough neurological exam is reassuring however.     1. Intractable migraine without aura and without status migrainosus    2. Cervicogenic headache    3. Upper back pain      Plan:     - MRI Brain W WO Contrast; Future  - Ambulatory referral/consult to Physical/Occupational Therapy; Future  -  ondansetron (ZOFRAN-ODT) 4 MG TbDL; Take 1 tablet (4 mg total) by mouth every 8 (eight) hours as needed (nausea of migraines).  Dispense: 20 tablet; Refill: 5  - cyclobenzaprine (FLEXERIL) 5 MG tablet; Take 1 tablet (5 mg total) by mouth nightly.  Dispense: 30 tablet; Refill: 1        Disclaimer: This note was partly generated using dictation software which may occasionally result in transcription errors that are missed on review.      Based on our encounter today, my overall Medical Decision Making is a Level 4     Complexity of Problem: Moderate (1 or more chronic illnesses with exacerbation, progression or treatment side effects)  Complexity of Data: Extensive (Review of >3 unique test results, Ordering each unique test, and Independent interpretation of tests)  Risk of Complications and/or morbidity/mortality of Management: Moderate risk (Prescription drug management)        Jenna Kerr MD    [facility]  08/16/2024

## 2024-10-13 DIAGNOSIS — G43.019 INTRACTABLE MIGRAINE WITHOUT AURA AND WITHOUT STATUS MIGRAINOSUS: ICD-10-CM

## 2024-10-13 DIAGNOSIS — G44.86 CERVICOGENIC HEADACHE: ICD-10-CM

## 2024-10-14 RX ORDER — CYCLOBENZAPRINE HCL 5 MG
5 TABLET ORAL NIGHTLY
Qty: 30 TABLET | Refills: 2 | Status: SHIPPED | OUTPATIENT
Start: 2024-10-14